# Patient Record
Sex: FEMALE | URBAN - METROPOLITAN AREA
[De-identification: names, ages, dates, MRNs, and addresses within clinical notes are randomized per-mention and may not be internally consistent; named-entity substitution may affect disease eponyms.]

---

## 2023-11-28 ENCOUNTER — OFFICE VISIT (OUTPATIENT)
Age: 43
End: 2023-11-28
Payer: MEDICAID

## 2023-11-28 VITALS — HEIGHT: 65 IN | WEIGHT: 207.6 LBS | HEART RATE: 70 BPM | OXYGEN SATURATION: 97 % | BODY MASS INDEX: 34.59 KG/M2

## 2023-11-28 DIAGNOSIS — M54.12 CERVICAL RADICULITIS: ICD-10-CM

## 2023-11-28 DIAGNOSIS — M54.2 NECK PAIN: Primary | ICD-10-CM

## 2023-11-28 PROCEDURE — 72040 X-RAY EXAM NECK SPINE 2-3 VW: CPT | Performed by: PHYSICAL MEDICINE & REHABILITATION

## 2023-11-28 PROCEDURE — 99204 OFFICE O/P NEW MOD 45 MIN: CPT | Performed by: PHYSICAL MEDICINE & REHABILITATION

## 2023-11-28 PROCEDURE — 73030 X-RAY EXAM OF SHOULDER: CPT | Performed by: PHYSICAL MEDICINE & REHABILITATION

## 2023-11-28 RX ORDER — BUSPIRONE HYDROCHLORIDE 10 MG/1
TABLET ORAL
COMMUNITY
Start: 2023-11-21

## 2023-11-28 RX ORDER — HYDROXYZINE HYDROCHLORIDE 25 MG/1
TABLET, FILM COATED ORAL
COMMUNITY
Start: 2023-10-21

## 2023-11-28 RX ORDER — GABAPENTIN 300 MG/1
300 CAPSULE ORAL NIGHTLY
Qty: 30 CAPSULE | Refills: 0 | Status: SHIPPED | OUTPATIENT
Start: 2023-11-28 | End: 2023-12-28

## 2023-11-28 RX ORDER — PREDNISONE 10 MG/1
TABLET ORAL
COMMUNITY
Start: 2023-10-08 | End: 2023-11-28 | Stop reason: ALTCHOICE

## 2023-11-28 RX ORDER — CYCLOBENZAPRINE HCL 10 MG
TABLET ORAL
COMMUNITY
Start: 2023-10-08

## 2023-11-28 RX ORDER — CETIRIZINE HYDROCHLORIDE 10 MG/1
TABLET ORAL
COMMUNITY
Start: 2023-11-21

## 2023-11-28 RX ORDER — IBUPROFEN 800 MG/1
TABLET ORAL
COMMUNITY
Start: 2023-10-02 | End: 2023-11-28

## 2023-11-28 RX ORDER — AMLODIPINE BESYLATE 10 MG/1
TABLET ORAL
COMMUNITY
Start: 2023-10-21

## 2023-11-28 ASSESSMENT — PATIENT HEALTH QUESTIONNAIRE - PHQ9
SUM OF ALL RESPONSES TO PHQ QUESTIONS 1-9: 2
SUM OF ALL RESPONSES TO PHQ QUESTIONS 1-9: 2
SUM OF ALL RESPONSES TO PHQ9 QUESTIONS 1 & 2: 2
SUM OF ALL RESPONSES TO PHQ QUESTIONS 1-9: 2
SUM OF ALL RESPONSES TO PHQ QUESTIONS 1-9: 2
2. FEELING DOWN, DEPRESSED OR HOPELESS: 1
1. LITTLE INTEREST OR PLEASURE IN DOING THINGS: 1

## 2023-11-28 NOTE — PROGRESS NOTES
Leslie Eason presents today for   Chief Complaint   Patient presents with    Neck Pain    Shoulder Pain     right       Is someone accompanying this pt? no    Is the patient using any DME equipment during OV? no    Depression Screening:       No data to display                Learning Assessment:  No flowsheet data found. Abuse Screening:       No data to display                Fall Risk  No flowsheet data found. OPIOID RISK TOOL  No flowsheet data found. Coordination of Care:  1. Have you been to the ER, urgent care clinic since your last visit? Yes -neck  oct 02 and 8 2023   Hospitalized since your last visit? no    2. Have you seen or consulted any other health care providers outside of the [address] [place] [address] since your last visit? no Include any pap smears or colon screening.  no

## 2023-11-28 NOTE — PROGRESS NOTES
[place] [id number] [facility], [address] [place] [address]   Phone: [phone]  Fax: [phone]      Patient: Joni Grayson                                                                              MRN: [id number]        YOB: 1980          AGE: 44 y.o. PCP: Venecia Cano MD  Date:  11/28/23    Reason for Consultation: Neck Pain and Shoulder Pain (right)      HPI:  Joni Grayson is a 44 y.o. female with relevant PMH of HTN, prior lumbar fusion L4-S1 in 2004 Dr. Xochitl Ch, who presents with neck and right shoulder pain which began September 2023.  10/2/2023 pain had worsened and [facility]ant and ibuprofen. Pain did not improve and she returned 10/8/2023 given a medrol pack which unfortunately has not helped. She recently started PT    Denies any precipitating incident or trauma. Neurologic symptoms: No numbness, tingling, weakness, bowel or bladder changes. No recent falls      Location: The pain is located in the neck  Radiation: The pain does radiate does radiate towards right . Pain Score: Currently: 1/10  At Best: 1/10  At Worst: 10/10   Quality: Pain is of a aching, cramping, stabbing, tight, pulling quality. Aggravating: Pain is exacerbated by sitting, standing, lying down, and exercise  Alleviating:  The pain is alleviated by nothing    Prior Treatments:  Physical therapy: Yes- started 10/27/2023-  [facility]   Injections:Yes  2022- right shoulder injection  Surgery:No  Pain management: previously seen by Dr. Renée Reynolds and also at [facility] comprehensive pain management  Previous Medications: medrol   Current Medications: flexeril 10mg , ibuprofen 800mg  Previous work-up has included:   X-ray cervical spine AP and Lat 11/28/23- straightening cervical lordosis, DD C4/5, C5/6  X-ray right shoulder 11/28/2023- unremarkable     Past Medical History:   Past Medical History:

## 2024-01-04 ENCOUNTER — OFFICE VISIT (OUTPATIENT)
Age: 44
End: 2024-01-04

## 2024-01-04 VITALS — HEIGHT: 65 IN | BODY MASS INDEX: 34.45 KG/M2 | WEIGHT: 206.8 LBS

## 2024-01-04 DIAGNOSIS — M54.2 NECK PAIN: Primary | ICD-10-CM

## 2024-01-04 DIAGNOSIS — M54.12 CERVICAL RADICULITIS: ICD-10-CM

## 2024-01-04 PROCEDURE — 99213 OFFICE O/P EST LOW 20 MIN: CPT | Performed by: PHYSICAL MEDICINE & REHABILITATION

## 2024-01-04 ASSESSMENT — PATIENT HEALTH QUESTIONNAIRE - PHQ9
1. LITTLE INTEREST OR PLEASURE IN DOING THINGS: 0
SUM OF ALL RESPONSES TO PHQ QUESTIONS 1-9: 0
SUM OF ALL RESPONSES TO PHQ9 QUESTIONS 1 & 2: 0
SUM OF ALL RESPONSES TO PHQ QUESTIONS 1-9: 0
2. FEELING DOWN, DEPRESSED OR HOPELESS: 0

## 2024-01-04 NOTE — PROGRESS NOTES
[facility]  [address] [place] [address], [address]  [place] [address]  Phone: [phone]  Fax: [phone]      Patient: Anne Chester                                                                              MRN: [id number]        YOB: 1980          AGE: 43 y.o.             PCP: Sunshine Randolph MD  Date:  01/04/24    Reason for Consultation: Neck Pain      HPI:  Anne Chester is a 43 y.o. female with relevant PMH of HTN, prior lumbar fusion L4-S1 in 2004 Dr. Ball, who presented with neck and right shoulder pain which began September 2023.  10/2/2023 pain had worsened and [facility]ant and ibuprofen. Pain did not improve and she returned 10/8/2023 given a medrol pack which unfortunately has not helped.  She recently started PT  but had to stop for a bit due to work and is now ready to resume therapy.    Denies any precipitating incident or trauma.       Neurologic symptoms: No numbness, tingling, weakness, bowel or bladder changes.  No recent falls      Location: The pain is located in the neck  Radiation: The pain does radiate does radiate towards right .    Pain Score: Currently: 1/10  At Best: 1/10  At Worst: 10/10   Quality: Pain is of a aching, cramping, stabbing, tight, pulling quality.    Aggravating: Pain is exacerbated by sitting, standing, lying down, and exercise  Alleviating: The pain is alleviated by nothing    Prior Treatments:  Physical therapy: Yes- started 10/27/2023-  [facility]   Injections:Yes  2022- right shoulder injection  Surgery:No  Pain management: previously seen by Dr. Barajas and also at [facility] comprehensive pain management  Previous Medications: medrol   Current Medications: flexeril 10mg , ibuprofen 800mg, gabapentin 300mg qhs- when needed but takes rarely   Previous work-up has included:   X-ray cervical spine AP and Lat 11/28/23- straightening cervical lordosis, DD C4/5,

## 2024-01-04 NOTE — PROGRESS NOTES
Anne Chester presents today for   Chief Complaint   Patient presents with    Neck Pain       Is someone accompanying this pt? no    Is the patient using any DME equipment during OV? no    Depression Screening:       No data to display                Learning Assessment:      Abuse Screening:       No data to display                Fall Risk      OPIOID RISK TOOL      Coordination of Care:  1. Have you been to the ER, urgent care clinic since your last visit? no  Hospitalized since your last visit? no    2. Have you seen or consulted any other health care providers outside of the [facility] since your last visit? no Include any pap smears or colon screening. no

## 2024-03-07 ENCOUNTER — TELEPHONE (OUTPATIENT)
Age: 44
End: 2024-03-07

## 2024-03-07 NOTE — TELEPHONE ENCOUNTER
Patient states her last day of PT will be on 3/26/2024, and wants to know if she should be seen sooner then her appt scheduled for 4/23/2024. Patient is also asking if she needs to continue going to PT.    Patient can be reached at [phone].

## 2024-03-07 NOTE — TELEPHONE ENCOUNTER
I don't see any updated PT notes in [facility] everywhere or inmotion.  Can we find out where she is doing PT? I would keep the appointment for 4/23/2024

## 2024-03-08 NOTE — TELEPHONE ENCOUNTER
I called and spoke to the pt. The pt was identified using 2 pt identifiers. She verbalized that she is going to [facility] at [place]. I reviewed the pt's chart and was able to see the physical therapy notes under care everywhere under documents. I let the pt know that the physical therapist will determine if she needs to have more sessions closer to the end of her referral. They will send a request over for the provider to review and she will sign this is if she agrees. The pt verbalized understanding and will keep the April appt. No questions or concerns voiced at this time.

## 2024-05-02 ENCOUNTER — OFFICE VISIT (OUTPATIENT)
Age: 44
End: 2024-05-02
Payer: MEDICAID

## 2024-05-02 VITALS — WEIGHT: 209 LBS | HEIGHT: 66 IN | BODY MASS INDEX: 33.59 KG/M2

## 2024-05-02 DIAGNOSIS — M54.2 NECK PAIN: ICD-10-CM

## 2024-05-02 DIAGNOSIS — M54.12 CERVICAL RADICULITIS: ICD-10-CM

## 2024-05-02 PROCEDURE — 99214 OFFICE O/P EST MOD 30 MIN: CPT | Performed by: PHYSICAL MEDICINE & REHABILITATION

## 2024-05-02 RX ORDER — GABAPENTIN 300 MG/1
300 CAPSULE ORAL NIGHTLY
Qty: 90 CAPSULE | Refills: 0 | Status: SHIPPED | OUTPATIENT
Start: 2024-05-02 | End: 2024-07-31

## 2024-05-02 ASSESSMENT — PATIENT HEALTH QUESTIONNAIRE - PHQ9
SUM OF ALL RESPONSES TO PHQ9 QUESTIONS 1 & 2: 0
SUM OF ALL RESPONSES TO PHQ QUESTIONS 1-9: 0
2. FEELING DOWN, DEPRESSED OR HOPELESS: NOT AT ALL
SUM OF ALL RESPONSES TO PHQ QUESTIONS 1-9: 0
1. LITTLE INTEREST OR PLEASURE IN DOING THINGS: NOT AT ALL

## 2024-05-02 NOTE — PROGRESS NOTES
Anne Chester presents today for   Chief Complaint   Patient presents with    Neck Pain     C-spine       Is someone accompanying this pt? no    Is the patient using any DME equipment during OV? no    Depression Screening:       No data to display                Learning Assessment:  Failed to redirect to the Timeline version of the SensorLogic SmartLink.    Abuse Screening:       No data to display                Fall Risk  Failed to redirect to the Timeline version of the SensorLogic SmartLink.    OPIOID RISK TOOL  Failed to redirect to the Timeline version of the SensorLogic SmartLink.    Coordination of Care:  1. Have you been to the ER, urgent care clinic since your last visit? no  Hospitalized since your last visit? no    2. Have you seen or consulted any other health care providers outside of the [facility] since your last visit? no Include any pap smears or colon screening. no

## 2024-05-02 NOTE — PROGRESS NOTES
[facility]  [address] [place] [address], [address]  [place] [address]  Phone: [phone]  Fax: [phone]      Patient: Anne Chester                                                                              MRN: [id number]        YOB: 1980          AGE: 44 y.o.             PCP: Sunshine Randolph MD  Date:  05/02/24    Reason for Consultation: Neck Pain (C-spine)      HPI:  Anne Chester is a 44 y.o. female with relevant PMH of HTN, prior lumbar fusion L4-S1 in 2004 Dr. Ball, who presented with neck and right shoulder pain which began September 2023.  10/2/2023 pain had worsened and [facility] relaxant and ibuprofen. Pain did not improve and she returned 10/8/2023 given a medrol pack which unfortunately has not helped.  She completed PT which which unfortunately did not help.    She takes gabapentin 300mg at night    Denies any precipitating incident or trauma.       Neurologic symptoms: No numbness, tingling, weakness, bowel or bladder changes.  No recent falls      Location: The pain is located in the neck  Radiation: The pain does radiate does radiate towards right .    Pain Score: Currently: 1/10  At Best: 1/10  At Worst: 10/10   Quality: Pain is of a aching, cramping, stabbing, tight, pulling quality.    Aggravating: Pain is exacerbated by sitting, standing, lying down, and exercise  Alleviating: The pain is alleviated by nothing    Prior Treatments:  Physical therapy: Yes- started 10/27/2023-  [facility]   Injections:Yes  2022- right shoulder injection  Surgery:No  Pain management: previously seen by Dr. Barajas and also at [facility] comprehensive pain management  Previous Medications: medrol   Current Medications: flexeril 10mg , ibuprofen 800mg, gabapentin 300mg qhs- when needed but takes rarely   Previous work-up has included:   X-ray cervical spine AP and Lat 11/28/23- straightening cervical lordosis, DD C4/5,

## 2024-10-03 ENCOUNTER — OFFICE VISIT (OUTPATIENT)
Age: 44
End: 2024-10-03
Payer: MEDICAID

## 2024-10-03 DIAGNOSIS — M25.512 ACUTE PAIN OF LEFT SHOULDER: Primary | ICD-10-CM

## 2024-10-03 DIAGNOSIS — M54.12 CERVICAL RADICULITIS: ICD-10-CM

## 2024-10-03 PROCEDURE — 99214 OFFICE O/P EST MOD 30 MIN: CPT | Performed by: PHYSICAL MEDICINE & REHABILITATION

## 2024-10-03 NOTE — PROGRESS NOTES
[facility]  [address] [place] [address], [address]  [place] [address]  Phone: [phone]  Fax: [phone]      Patient: Anne Chester                                                                              MRN: [id number]        YOB: 1980          AGE: 44 y.o.             PCP: Sunshine Randolph MD  Date:  10/03/24    Reason for Consultation: left shoulder pain      HPI:  Anne Chester is a 44 y.o. female with relevant PMH of HTN, prior lumbar fusion L4-S1 in 2004 Dr. Ball, who presented with neck and right shoulder pain which began September 2023.  Today she returns with left shoulder pain the pain began 9/21/2024.   She was seen in the ED 9/25/2024 and x-ray left shoulder unremarkable, she was given diclofenac and robaxin.  She cannot lift her left arm.      She takes gabapentin 300mg at night    Denies any precipitating incident or trauma.       Neurologic symptoms: No numbness, tingling, weakness, bowel or bladder changes.  No recent falls      Location: The pain is located in the left arm   Radiation: The pain does radiate into left shoulder    Pain Score: Currently: 1/10  At Best: 1/10  At Worst: 10/10   Quality: Pain is of a aching, cramping, stabbing, tight, pulling quality.    Aggravating: Pain is exacerbated by sitting, standing, lying down, and exercise  Alleviating: The pain is alleviated by nothing    Prior Treatments:  Physical therapy: Yes- started 10/27/2023- completed 3/18/2024  [facility]   Injections:Yes  2022- right shoulder injection  Surgery:No  Pain management: previously seen by Dr. Barajas and also at [facility] comprehensive pain management  Previous Medications: medrol   Current Medications: flexeril 10mg , ibuprofen 800mg, gabapentin 300mg qhs- when needed but takes rarely   Previous work-up has included:   X-ray cervical spine AP and Lat 11/28/23- straightening cervical lordosis, DD C4/5, C5/6  X-ray right shoulder 9/25/2024-

## 2024-10-03 NOTE — PROGRESS NOTES
Anne Chester presents today for   Chief Complaint   Patient presents with    Shoulder Pain    Knee Pain       Is someone accompanying this pt? no    Is the patient using any DME equipment during OV? no    Depression Screening:       No data to display                Learning Assessment:  Failed to redirect to the Timeline version of the [employer] - /l?k â€¢ splik/ SmartLink.    Abuse Screening:       No data to display                Fall Risk  Failed to redirect to the Timeline version of the [employer] - /l?k â€¢ splik/ SmartLink.    OPIOID RISK TOOL  Failed to redirect to the Timeline version of the L[employer] - /l?k â€¢ splik/ SmartLink.    Coordination of Care:  1. Have you been to the ER, urgent care clinic since your last visit? Yes  Hospitalized since your last visit? no    2. Have you seen or consulted any other health care providers outside of the [facility] since your last visit? no Include any pap smears or colon screening. no

## 2024-10-04 ENCOUNTER — TELEPHONE (OUTPATIENT)
Age: 44
End: 2024-10-04

## 2024-10-04 NOTE — TELEPHONE ENCOUNTER
The MRI order, last office note, and demographics were printed and faxed over to the [facility] Sched[facility] for review. A fax confirmation was received. I called and notified Ms. Chester that this has been done. She was also notified that it can take up to 24 hrs for the order to upload in their system before they try to call her. She verbalized understanding and has no questions or concerns. She has the number to the scheduling dept to call if she has questions.

## 2024-11-06 ENCOUNTER — OFFICE VISIT (OUTPATIENT)
Age: 44
End: 2024-11-06
Payer: MEDICAID

## 2024-11-06 VITALS — HEIGHT: 66 IN | RESPIRATION RATE: 16 BRPM | BODY MASS INDEX: 33.73 KG/M2

## 2024-11-06 DIAGNOSIS — M54.12 CERVICAL RADICULITIS: Primary | ICD-10-CM

## 2024-11-06 PROCEDURE — 99213 OFFICE O/P EST LOW 20 MIN: CPT | Performed by: ORTHOPAEDIC SURGERY

## 2024-11-06 NOTE — PROGRESS NOTES
[facility] AMBULATORY OFFICE NOTE      Patient: Anne Chester                MRN: [id number]       SSN: [id number]  YOB: 1980        AGE: 44 y.o.        SEX: female  Body mass index is 33.73 kg/m².    PCP: Sunshine Randolph MD  11/06/24    CHIEF COMPLAINT: Left-sided neck pain    HPI: Anne Chester is a 44 y.o. female patient who complains of several months of left shoulder and left-sided neck pain.  No specific injury or trauma.  She rates the pain as 3 out of 10 and notices that it is worse when lying down.  She had an issue with decreased range of motion she says both active and passive about a month ago.  Over the past few weeks that has come back.  She is moving her arm better now.  She says she has a history of disks in her neck.  She is set up for an MRI later this month.    Past Medical History:   Diagnosis Date    HTN (hypertension)        No family history on file.    Current Outpatient Medications   Medication Sig Dispense Refill    gabapentin (NEURONTIN) 300 MG capsule Take 1 capsule by mouth nightly for 90 days. Intended supply: 30 days Max Daily Amount: 300 mg 90 capsule 0    amLODIPine (NORVASC) 10 MG tablet       busPIRone (BUSPAR) 10 MG tablet       cetirizine (ZYRTEC) 10 MG tablet       cyclobenzaprine (FLEXERIL) 10 MG tablet TAKE ONE TAB BY MOUTH EVERY 8 HOURS AS NEEDED FOR MUSCLE CRAMPS/SPASMS      hydrOXYzine HCl (ATARAX) 25 MG tablet        No current facility-administered medications for this visit.       Allergies   Allergen Reactions    Cymbalta [Duloxetine Hcl]        No past surgical history on file.    Social History     Socioeconomic History    Marital status: Single     Spouse name: Not on file    Number of children: Not on file    Years of education: Not on file    Highest education level: Not on file   Occupational History    Not on file   Tobacco Use    Smoking status: Never     Passive exposure: Never    Smokeless tobacco: Never

## 2024-12-04 NOTE — PROGRESS NOTES
Gabapentin 300mg to TID ramp. Pt may call for increase dose if tolerated and needs increase in medication  Referral to Pain Management ([place])- for further treatment options of chronic pain    Discussed importance of maintaining consistent physical activity and mobility for muscle strengthening and conditioning.   Cautioned pt regarding sudden neck movements which may cause impact to spinal cord.   Discussed indications for surgery including worsening balance issues, weakness in arms, and decreased sensation.  We discussed strategies and recommendations for postural restoration and maintenance.   Follow-up and Dispositions    Return in about 3 months (around 3/10/2025) for medication follow up.       Anne was seen today for neck pain, shoulder pain and arm pain.    Diagnoses and all orders for this visit:    Cervical spinal stenosis  -     Amb External Referral To Spine Surgery  -     gabapentin (NEURONTIN) 300 MG capsule; Take 1 capsule by mouth 3 times daily for 180 days. Max Daily Amount: 900 mg  -     Amb External Referral To Pain Medicine    Cervical pain    Cervical radiculopathy    Chronic primary musculoskeletal pain         PAST MEDICAL HISTORY   Past Medical History:   Diagnosis Date    HTN (hypertension)        History reviewed. No pertinent surgical history.    MEDICATIONS      Current Outpatient Medications   Medication Sig Dispense Refill    fluticasone (FLONASE) 50 MCG/ACT nasal spray       FLUoxetine (PROZAC) 40 MG capsule       lidocaine (LIDODERM) 5 % Apply 1 patch as directed for 12 hours every 24 hours (12 hours on, 12 hours off)      budesonide-formoterol (SYMBICORT) 80-4.5 MCG/ACT AERO Inhale 2 puffs into the lungs 2 times daily      vitamin D (ERGOCALCIFEROL) 1.25 MG (34163 UT) CAPS capsule Take 1 capsule by mouth once a week      gabapentin (NEURONTIN) 300 MG capsule Take 1 capsule by mouth 3 times daily for 180 days. Max Daily Amount: 900 mg 90 capsule 5    gabapentin (NEURONTIN) 300

## 2024-12-10 ENCOUNTER — OFFICE VISIT (OUTPATIENT)
Age: 44
End: 2024-12-10
Payer: MEDICAID

## 2024-12-10 VITALS — BODY MASS INDEX: 33.73 KG/M2 | HEIGHT: 66 IN | RESPIRATION RATE: 16 BRPM

## 2024-12-10 DIAGNOSIS — M54.2 CERVICAL PAIN: ICD-10-CM

## 2024-12-10 DIAGNOSIS — M54.12 CERVICAL RADICULOPATHY: ICD-10-CM

## 2024-12-10 DIAGNOSIS — G89.29 CHRONIC PRIMARY MUSCULOSKELETAL PAIN: ICD-10-CM

## 2024-12-10 DIAGNOSIS — M48.02 CERVICAL SPINAL STENOSIS: Primary | ICD-10-CM

## 2024-12-10 DIAGNOSIS — M79.18 CHRONIC PRIMARY MUSCULOSKELETAL PAIN: ICD-10-CM

## 2024-12-10 PROCEDURE — 99214 OFFICE O/P EST MOD 30 MIN: CPT | Performed by: PHYSICAL MEDICINE & REHABILITATION

## 2024-12-10 RX ORDER — BUDESONIDE AND FORMOTEROL FUMARATE DIHYDRATE 80; 4.5 UG/1; UG/1
2 AEROSOL RESPIRATORY (INHALATION) 2 TIMES DAILY
COMMUNITY

## 2024-12-10 RX ORDER — FLUTICASONE PROPIONATE 50 MCG
SPRAY, SUSPENSION (ML) NASAL
COMMUNITY

## 2024-12-10 RX ORDER — IBUPROFEN 800 MG/1
TABLET, FILM COATED ORAL
COMMUNITY

## 2024-12-10 RX ORDER — FLUOXETINE 40 MG/1
CAPSULE ORAL
COMMUNITY

## 2024-12-10 RX ORDER — ERGOCALCIFEROL 1.25 MG/1
50000 CAPSULE, LIQUID FILLED ORAL WEEKLY
COMMUNITY

## 2024-12-10 RX ORDER — GABAPENTIN 300 MG/1
300 CAPSULE ORAL 3 TIMES DAILY
Qty: 90 CAPSULE | Refills: 5 | Status: SHIPPED | OUTPATIENT
Start: 2024-12-10 | End: 2025-06-08

## 2024-12-10 RX ORDER — LIDOCAINE 50 MG/G
PATCH TOPICAL
COMMUNITY
Start: 2024-09-25

## 2024-12-10 RX ORDER — HYDROCORTISONE VALERATE 2 MG/G
OINTMENT TOPICAL
COMMUNITY

## 2024-12-10 ASSESSMENT — ENCOUNTER SYMPTOMS
TROUBLE SWALLOWING: 0
WHEEZING: 0
BACK PAIN: 1
NAUSEA: 0
SHORTNESS OF BREATH: 0
VOMITING: 0

## 2024-12-26 ENCOUNTER — TELEPHONE (OUTPATIENT)
Age: 44
End: 2024-12-26

## 2024-12-26 NOTE — TELEPHONE ENCOUNTER
Pt is requesting a handicap placard paperwork to be completed and available for pick-up. Please contact pt and advise.    callback # [phone]

## 2024-12-26 NOTE — TELEPHONE ENCOUNTER
Patient called in and stated that the [facility] physician Dr. Ballard is no longer seeing new patients.. Patient would like to know if there was another Neuro that she can be referred to    Please advise patient @ [phone]

## 2024-12-26 NOTE — TELEPHONE ENCOUNTER
Patient is requesting for us to send her referral to Dr. Chung at [facility], and therir phone# [phone].    Patient states that she has an appt with them on 01/29/2025 and if we can send the referral before her appt.    Patient tel [phone]

## 2024-12-26 NOTE — TELEPHONE ENCOUNTER
Pt was made aware Dr. Ellison is out of the office this entire week but when he returns we will be able to address asking him about the disability placard and getting the referral changed to a Dr. Chung due to the original provider not accepting any new patients. Pt was very kind and understanding and is aware she will be called sometime next week to discuss.

## 2024-12-27 NOTE — TELEPHONE ENCOUNTER
Called [facility] [phone] to inquire if we needed to submit a new referral. She stated no because Dr. Ballard is not accepting new patients because she is retiring but Dr. Alverto Keller will be taking on her patients and he goes to [facility]. I will speak to Dr. Scot nava he returns to the office on 1/7/2025 when he is back in the office to possible sign it if he agrees.

## 2025-01-13 ENCOUNTER — TELEPHONE (OUTPATIENT)
Age: 45
End: 2025-01-13

## 2025-01-13 NOTE — TELEPHONE ENCOUNTER
Pt was referred to Dr. Friedman as she probably needs cervical surgery.  Does she have the appt?  If she truly cannot swallow then she needs to go to the ER.

## 2025-01-13 NOTE — TELEPHONE ENCOUNTER
Called patient [phone] and verified her name and date of birth. I informed her of the providers message. She stated she figured that is what we were going to say. She then inquired what should say when she gets there. I informed her that she can let them what has been going on that we have referred her to the neurosurgeon for evaluation for cervical surgery and she has an appointment with them on 1/29 but  her symptoms are now getting worse with her having difficulty swallowing. I wished her good luck and patient stated she would go ahead and go now. Patient verbalized understanding and no further action needed at this time.

## 2025-01-13 NOTE — TELEPHONE ENCOUNTER
Patient states she has been having neck pain, and a hard time swallowing for the past 3 days. Patient is asking to speak to a nurse about her concerns.     Patient state she has an appt to the Neurologist with  on 1/29/2025.    Patient can be reached at [phone].

## 2025-03-28 NOTE — PROGRESS NOTES
[facility]  [facility], [address]  [place] [address]  Phone: [phone]  Fax: [phone]      Anne Chester  : 1980  PCP: Sunshine Randolph MD  2025    PROGRESS NOTE    HISTORY OF PRESENT ILLNESS    10/3/24 (Dr. Cordova)  prior lumbar fusion L4-S1 in  Dr. Ball,   presented with neck and right shoulder pain which began 2023.    Pt c/o left shoulder pain the pain began 2024.     She was seen in the ED 2024 and x-ray left shoulder unremarkable, she was given diclofenac and robaxin.    She cannot lift her left arm.   She takes gabapentin 300mg at night  Pt attended PT (10/27/23-3/18/24).  PLAN: refill gabapentin 300mg QHS; Cervical MRI. Referral to ortho.    12/10/24  Pt notes she is aware of treatment options including PT or surgery. Pt denies balance difficulty.   Pt notes she previously presented with inability to lift her left arm.   Pt notes she has to lay on more pillows at night to sleep. Pt continues with HEP from recent course of PT.   Pt notes minimal benefit with gabapentin 300mg QHS.   Pt c/o numbness in neck and upper trap.   Pt notes she smokes weed with benefit for pain and anxiety.   Cervical MRI images dated 11/10/24 were reviewed. Per report, Central disc protrusions at each level from C3 to C6, resulting in moderate spinal canal stenosis at C3-C4 and moderate to severe spinal canal stenosis at C4-C5 and C5-C6. Moderate to severe bilateral foraminal stenosis at C4-C5, severe left and moderate right foraminal stenosis at C5-C6.    PLAN: Referral to Dr. Ballard; Increase Gabapentin 300mg to TID ramp; Referral to Pain Management ([place])      Anne Chester is a 45 y.o. female was seen today for follow up. Pt messaged stating Dr. Ballard was no longer accepting new patients and was referred to Dr. Keller. Pt messaged  for dysphagia. Pt underwent C3-7 ACDF with plating 3/21/25 with Dr. Keller. Pt

## 2025-04-01 ENCOUNTER — OFFICE VISIT (OUTPATIENT)
Age: 45
End: 2025-04-01
Payer: MEDICAID

## 2025-04-01 VITALS
RESPIRATION RATE: 16 BRPM | BODY MASS INDEX: 30.13 KG/M2 | HEIGHT: 66 IN | HEART RATE: 91 BPM | DIASTOLIC BLOOD PRESSURE: 87 MMHG | WEIGHT: 187.5 LBS | SYSTOLIC BLOOD PRESSURE: 122 MMHG

## 2025-04-01 DIAGNOSIS — M54.2 CERVICAL PAIN: ICD-10-CM

## 2025-04-01 DIAGNOSIS — Z98.1 S/P CERVICAL SPINAL FUSION: Primary | ICD-10-CM

## 2025-04-01 DIAGNOSIS — M79.18 CHRONIC PRIMARY MUSCULOSKELETAL PAIN: ICD-10-CM

## 2025-04-01 DIAGNOSIS — G89.29 CHRONIC PRIMARY MUSCULOSKELETAL PAIN: ICD-10-CM

## 2025-04-01 DIAGNOSIS — M54.12 CERVICAL RADICULOPATHY: ICD-10-CM

## 2025-04-01 DIAGNOSIS — M48.02 CERVICAL SPINAL STENOSIS: ICD-10-CM

## 2025-04-01 PROCEDURE — 99214 OFFICE O/P EST MOD 30 MIN: CPT | Performed by: PHYSICAL MEDICINE & REHABILITATION

## 2025-04-01 RX ORDER — OXYCODONE HYDROCHLORIDE 5 MG/1
TABLET ORAL
COMMUNITY
Start: 2025-03-22

## 2025-04-01 RX ORDER — METHOCARBAMOL 500 MG/1
1000 TABLET, FILM COATED ORAL 3 TIMES DAILY PRN
COMMUNITY
Start: 2025-03-22

## 2025-04-01 ASSESSMENT — ENCOUNTER SYMPTOMS
NAUSEA: 0
SHORTNESS OF BREATH: 0
TROUBLE SWALLOWING: 1
WHEEZING: 0
BACK PAIN: 1
VOMITING: 0